# Patient Record
Sex: FEMALE | Race: WHITE | NOT HISPANIC OR LATINO
[De-identification: names, ages, dates, MRNs, and addresses within clinical notes are randomized per-mention and may not be internally consistent; named-entity substitution may affect disease eponyms.]

---

## 2019-05-14 PROBLEM — Z00.00 ENCOUNTER FOR PREVENTIVE HEALTH EXAMINATION: Status: ACTIVE | Noted: 2019-05-14

## 2019-06-24 ENCOUNTER — APPOINTMENT (OUTPATIENT)
Dept: OTOLARYNGOLOGY | Facility: CLINIC | Age: 44
End: 2019-06-24
Payer: COMMERCIAL

## 2019-06-24 VITALS — HEIGHT: 60 IN | BODY MASS INDEX: 22.58 KG/M2 | WEIGHT: 115 LBS

## 2019-06-24 DIAGNOSIS — Z80.9 FAMILY HISTORY OF MALIGNANT NEOPLASM, UNSPECIFIED: ICD-10-CM

## 2019-06-24 DIAGNOSIS — Z82.49 FAMILY HISTORY OF ISCHEMIC HEART DISEASE AND OTHER DISEASES OF THE CIRCULATORY SYSTEM: ICD-10-CM

## 2019-06-24 DIAGNOSIS — Z83.438 FAMILY HISTORY OF OTHER DISORDER OF LIPOPROTEIN METABOLISM AND OTHER LIPIDEMIA: ICD-10-CM

## 2019-06-24 DIAGNOSIS — Z86.2 PERSONAL HISTORY OF DISEASES OF THE BLOOD AND BLOOD-FORMING ORGANS AND CERTAIN DISORDERS INVOLVING THE IMMUNE MECHANISM: ICD-10-CM

## 2019-06-24 DIAGNOSIS — Z83.3 FAMILY HISTORY OF DIABETES MELLITUS: ICD-10-CM

## 2019-06-24 DIAGNOSIS — Z83.511 FAMILY HISTORY OF GLAUCOMA: ICD-10-CM

## 2019-06-24 DIAGNOSIS — Z86.79 PERSONAL HISTORY OF OTHER DISEASES OF THE CIRCULATORY SYSTEM: ICD-10-CM

## 2019-06-24 DIAGNOSIS — Z82.5 FAMILY HISTORY OF ASTHMA AND OTHER CHRONIC LOWER RESPIRATORY DISEASES: ICD-10-CM

## 2019-06-24 PROCEDURE — 99204 OFFICE O/P NEW MOD 45 MIN: CPT | Mod: 25

## 2019-06-24 PROCEDURE — 69801 INCISE INNER EAR: CPT

## 2019-06-24 RX ORDER — CHOLECALCIFEROL (VITAMIN D3) 25 MCG
TABLET ORAL
Refills: 0 | Status: ACTIVE | COMMUNITY

## 2019-06-24 NOTE — ASSESSMENT
[FreeTextEntry1] : Symptoms and findings are most consistent with otosclerosis. We discussed this diagnosis as well as other potential causes of conductive hearing loss. She wishes to consider surgical intervention. All risks limitations, complications and alternatives reviewed in detail.  Questions answered.\par \par CT scan of the temporal bone recommended. She understands that this is optional.\par \par Surgical plan: Left laser stapedotomy, right hand vein graft, possible fascia graft. no

## 2019-06-24 NOTE — PHYSICAL EXAM
[FreeTextEntry1] : Procedure: Microscopic Ear Exam\par \par Left ear:  Ear canal intact without inflammation or lesion.  \par Tympanic membrane intact without inflammation.\par \par Right ear:  Ear canal intact without inflammation or lesion.  \par Tympanic membrane intact without inflammation.\par \par [place] Hearing Assessment\par 512 Hz:\par Barclay test: referred to the left ear\par Rinne test:\par 	Right Ear: Air Conduction < Bone Conduction\par 	Left Ear:   Air Conduction < Bone conduction [Midline] : trachea located in midline position [Normal] : no rashes

## 2019-06-24 NOTE — DATA REVIEWED
[de-identified] : Previous audiometry presented and reviewed. This includes audiometry from May 2019 indicating significant conductive hearing loss affecting the left ear greater than the right.

## 2019-06-24 NOTE — HISTORY OF PRESENT ILLNESS
[de-identified] : KORY GOLDBERG has a history of Progressive hearing loss for several years affecting the left ear greater than the right. This was first noted during her pregnancy 15 years ago. Pulsatile tinnitus was noted and persists. No prior history of infectious ear disease or head injury. No known family history of premature hearing loss.\par \par Recent trial with amplification was not successful. She did not feel that it helped her.

## 2019-10-09 RX ORDER — CEFADROXIL 500 MG/1
500 CAPSULE ORAL TWICE DAILY
Qty: 10 | Refills: 0 | Status: ACTIVE | COMMUNITY
Start: 2019-10-09 | End: 1900-01-01

## 2019-10-11 ENCOUNTER — TRANSCRIPTION ENCOUNTER (OUTPATIENT)
Age: 44
End: 2019-10-11

## 2019-10-11 ENCOUNTER — OUTPATIENT (OUTPATIENT)
Dept: OUTPATIENT SERVICES | Facility: HOSPITAL | Age: 44
LOS: 1 days | Discharge: HOME | End: 2019-10-11
Payer: COMMERCIAL

## 2019-10-11 ENCOUNTER — RESULT REVIEW (OUTPATIENT)
Age: 44
End: 2019-10-11

## 2019-10-11 ENCOUNTER — APPOINTMENT (OUTPATIENT)
Dept: OTOLARYNGOLOGY | Facility: AMBULATORY SURGERY CENTER | Age: 44
End: 2019-10-11

## 2019-10-11 PROCEDURE — 69660 REVISE MIDDLE EAR BONE: CPT | Mod: LT

## 2019-10-11 PROCEDURE — 37799 UNLISTED PX VASCULAR SURGERY: CPT

## 2019-10-11 PROCEDURE — 95867 NDL EMG CRANIAL NRV MUSC UNI: CPT | Mod: 26

## 2019-11-18 ENCOUNTER — APPOINTMENT (OUTPATIENT)
Dept: OTOLARYNGOLOGY | Facility: CLINIC | Age: 44
End: 2019-11-18
Payer: COMMERCIAL

## 2019-11-18 DIAGNOSIS — H93.299 OTHER ABNORMAL AUDITORY PERCEPTIONS, UNSPECIFIED EAR: ICD-10-CM

## 2019-11-18 DIAGNOSIS — H90.2 CONDUCTIVE HEARING LOSS, UNSPECIFIED: ICD-10-CM

## 2019-11-18 PROCEDURE — 92550 TYMPANOMETRY & REFLEX THRESH: CPT

## 2019-11-18 PROCEDURE — 92557 COMPREHENSIVE HEARING TEST: CPT

## 2019-11-18 PROCEDURE — 99024 POSTOP FOLLOW-UP VISIT: CPT

## 2019-11-18 NOTE — HISTORY OF PRESENT ILLNESS
[de-identified] : KORY GOLDBERG has a history of Progressive hearing loss for several years affecting the left ear greater than the right. This was first noted during her pregnancy 15 years ago. Pulsatile tinnitus was noted and persists. No prior history of infectious ear disease or head injury. No known family history of premature hearing loss.\par \par Recent trial with amplification was not successful. She did not feel that it helped her. [FreeTextEntry1] : 11/18/2019 \par History\par post operative visit.\par Reports no significant pain.  No significant tinnitus.  No significant vertigo. Hearing has improved\par \par Physical Exam\par \par Hand: Incision intact, non inflamed\par \par Microscopic Ear Exam\par Left Ear:  Ear canal healing well.  The tympanic membrane is intact.\par \par Complete audiometry was ordered and completed today. This was separately reported by the audiologist. The results were reviewed in detail with the patient.\par \par

## 2020-06-29 ENCOUNTER — APPOINTMENT (OUTPATIENT)
Dept: OTOLARYNGOLOGY | Facility: CLINIC | Age: 45
End: 2020-06-29